# Patient Record
Sex: MALE | Race: WHITE | NOT HISPANIC OR LATINO | URBAN - METROPOLITAN AREA
[De-identification: names, ages, dates, MRNs, and addresses within clinical notes are randomized per-mention and may not be internally consistent; named-entity substitution may affect disease eponyms.]

---

## 2024-10-26 ENCOUNTER — TELEMEDICINE (OUTPATIENT)
Dept: ENDOCRINOLOGY | Facility: CLINIC | Age: 44
End: 2024-10-26
Payer: COMMERCIAL

## 2024-10-26 DIAGNOSIS — Z31.41 FERTILITY TESTING: ICD-10-CM

## 2024-10-26 DIAGNOSIS — Z11.3 ENCOUNTER FOR SCREENING EXAMINATION FOR SEXUALLY TRANSMITTED DISEASE: Primary | ICD-10-CM

## 2024-10-26 PROCEDURE — 99202 OFFICE O/P NEW SF 15 MIN: CPT | Performed by: OBSTETRICS & GYNECOLOGY

## 2024-10-26 NOTE — PROGRESS NOTES
PARTNER HISTORY      Fabi Lopez (Legal Name)     43 y.o., 8/1/1981          Occupation: [employer]  Prior fertility history: 1 son together  PMH: BCC (small lesion removed)  PSH: None, pilonidal cyst  Past psych history: No  Prior hospitalization for mental health disorder: No  History of reproductive injuries or surgeries:: No  Smoking: No  Alcohol Use: No  Drug Use: No  History of incarceration: No  Medications: None  History of STD:  No  History of testosterone use: No  History of reproductive anomalies: No  Prior Semen Analysis completed? No    PLAN    Orders Placed This Encounter   Procedures    Hepatitis B surface antigen    Hepatitis C antibody    HIV 1/2 Antigen/Antibody Screen with Reflex to Confirmation    Syphilis Screen with Reflex    C. trachomatis / N. gonorrhoeae, Amplified    POCT Semen Analysis Complete with Strict Morphology     Mirna Liao 10/26/24 1:54 PM

## 2024-11-15 ENCOUNTER — ANCILLARY PROCEDURE (OUTPATIENT)
Dept: ENDOCRINOLOGY | Facility: CLINIC | Age: 44
End: 2024-11-15
Payer: COMMERCIAL

## 2024-11-15 DIAGNOSIS — Z11.3 ENCOUNTER FOR SCREENING EXAMINATION FOR SEXUALLY TRANSMITTED DISEASE: ICD-10-CM

## 2024-11-15 DIAGNOSIS — Z31.41 FERTILITY TESTING: ICD-10-CM

## 2024-11-15 LAB
% EX RESIDUAL CYTOPLASM (SEMEN): 0 %
% EX RESIDUAL CYTOPLASM (SEMEN): 0 %
% HEAD DEFECTS (SEMEN): 96 %
% NECK MIDPIECE (SEMEN): 42 %
% NECK MIDPIECE (SEMEN): 42 %
% NORMAL (SEMEN): 4 % (ref 4–?)
% TAIL DEFECTS (SEMEN): 7 %
% TAIL DEFECTS (SEMEN): 7 %
ABSTINENCE (DAYS): 11 DAYS (ref 2–7)
ABSTINENCE (DAYS): 11 DAYS (ref 2–7)
AGGLUTINATION (SEMEN): NO
AMOUNT MISSED:: ABNORMAL
ANALYZED TIME:: ABNORMAL
ANALYZED TIME:: ABNORMAL
ANDROLOGY LAB ID#: ABNORMAL
CLUMPS (SEMEN): YES
COLLECTED COMPLETELY: YES
COLLECTED COMPLETELY: YES
COLLECTION LOCATION:: ABNORMAL
COLLECTION LOCATION:: ABNORMAL
COLLECTION METHOD:: ABNORMAL
COLLECTION METHOD:: ABNORMAL
CONCENTRATION (URINE): ABNORMAL
CONCENTRATION(SEMEN): 59.55 MILL/ML (ref 15–?)
DEBRIS (SEMEN): YES
NON PROG. MOTILITY (SEMEN): 9 %
NON PROG. MOTILITY (URINE): ABNORMAL
PORTION MISSED REI: ABNORMAL
PROG. MOTILITY (SEMEN): 45 % (ref 32–?)
PROG. MOTILITY (URINE): ABNORMAL
RECEIVED TIME:: ABNORMAL
RECEIVED TIME:: ABNORMAL
REI PARTNER DOB: ABNORMAL
REI PARTNER NAME: ABNORMAL
SEMEN APPEARANCE: NORMAL
SEMEN LIQUEFACTION: NORMAL
SEMEN VISCOSITY: NORMAL
TOT. NO OF NORM. MOTILE SPERM (SEMEN): 14.31 MILL
TOT. NO OF NORM. MOTILE SPERM (SEMEN): 14.31 MILL
TOT. NO OF NORM. SPERM (SEMEN): 26.68 MILL
TOT. NO OF NORM. SPERM (SEMEN): 26.68 MILL
TOTAL MOTILITY (SEMEN): 54 % (ref 40–?)
TOTAL MOTILITY (URINE): ABNORMAL
TOTAL NO OF MOTILE (SEMEN): 357.79 MILL
TOTAL NO OF MOTILE (URINE): ABNORMAL
TOTAL NO OF RND CELLS (URINE): ABNORMAL
TOTAL NO OF SPERM (SEMEN): 666.99 MILL (ref 39–?)
TOTAL NO OF SPERM (URINE): ABNORMAL
VOLUME (SEMEN): 11.2 ML (ref 1.5–?)
VOLUME OF URINE: ABNORMAL

## 2024-11-15 PROCEDURE — 89322 SEMEN ANAL STRICT CRITERIA: CPT | Performed by: STUDENT IN AN ORGANIZED HEALTH CARE EDUCATION/TRAINING PROGRAM

## 2024-11-15 PROCEDURE — 87491 CHLMYD TRACH DNA AMP PROBE: CPT

## 2024-11-15 PROCEDURE — 87340 HEPATITIS B SURFACE AG IA: CPT

## 2024-11-15 PROCEDURE — 87389 HIV-1 AG W/HIV-1&-2 AB AG IA: CPT

## 2024-11-15 PROCEDURE — 86803 HEPATITIS C AB TEST: CPT

## 2024-11-15 PROCEDURE — 86780 TREPONEMA PALLIDUM: CPT

## 2024-11-15 PROCEDURE — 87591 N.GONORRHOEAE DNA AMP PROB: CPT

## 2024-11-16 LAB
HBV SURFACE AG SERPL QL IA: NONREACTIVE
HCV AB SER QL: NONREACTIVE
HIV 1+2 AB+HIV1 P24 AG SERPL QL IA: NONREACTIVE
TREPONEMA PALLIDUM IGG+IGM AB [PRESENCE] IN SERUM OR PLASMA BY IMMUNOASSAY: NONREACTIVE

## 2024-11-17 LAB
C TRACH RRNA SPEC QL NAA+PROBE: NEGATIVE
N GONORRHOEA DNA SPEC QL PROBE+SIG AMP: NEGATIVE

## 2025-01-31 ENCOUNTER — ANCILLARY PROCEDURE (OUTPATIENT)
Dept: ENDOCRINOLOGY | Facility: CLINIC | Age: 45
End: 2025-01-31

## 2025-01-31 ENCOUNTER — APPOINTMENT (OUTPATIENT)
Dept: ENDOCRINOLOGY | Facility: CLINIC | Age: 45
End: 2025-01-31
Payer: COMMERCIAL

## 2025-01-31 DIAGNOSIS — Z31.41 FERTILITY TESTING: ICD-10-CM

## 2025-01-31 LAB
ABSTINENCE (DAYS): 2 DAYS (ref 2–7)
AGGLUTINATION (SEMEN): NO
ANALYZED TIME:: ABNORMAL
ANDROLOGY LAB ID#: ABNORMAL
CLUMPS (SEMEN): NO
COLLECTED COMPLETELY: YES
COLLECTION LOCATION:: ABNORMAL
COLLECTION METHOD:: ABNORMAL
CONCENTRATION CN (POST-WASH): 28.18 MILL/ML
CONCENTRATION(SEMEN): 31.75 MILL/ML (ref 15–?)
DEBRIS (SEMEN): YES
NON PROG. MOTILITY (SEMEN): 3 %
NON PROG. MOTILITY CN (POST-WASH): 6 %
PROG. MOTILITY (SEMEN): 31 % (ref 32–?)
PROG. MOTILITY CN (POST-WASH): 61 %
RECEIVED TIME:: ABNORMAL
REI PARTNER DOB: ABNORMAL
REI PARTNER NAME: ABNORMAL
SEMEN APPEARANCE: NORMAL
SEMEN LIQUEFACTION: NORMAL
SEMEN VISCOSITY: NORMAL
SPERM PROCESS METHOD: ABNORMAL
TOTAL MOTILITY (SEMEN): 34 % (ref 40–?)
TOTAL MOTILITY CN (POST-WASH): 67 %
TOTAL NO OF MOTILE (SEMEN): 70.16 MILL
TOTAL NO OF MOTILE CN (POST-WASH): 9.51 MILL
TOTAL NO OF SPERM (SEMEN): 206.35 MILL (ref 39–?)
TOTAL NO OF SPERM CN (POST-WASH): 14.09 MILL
VOLUME (SEMEN): 6.5 ML (ref 1.5–?)
VOLUME CN (POST-WASH): 0.5 ML

## 2025-01-31 PROCEDURE — 89261 SPERM ISOLATION COMPLEX: CPT | Performed by: OBSTETRICS & GYNECOLOGY

## 2025-02-14 DIAGNOSIS — Z31.41 FERTILITY TESTING: ICD-10-CM

## 2025-02-27 ENCOUNTER — ANCILLARY PROCEDURE (OUTPATIENT)
Dept: ENDOCRINOLOGY | Facility: CLINIC | Age: 45
End: 2025-02-27

## 2025-02-27 DIAGNOSIS — Z31.41 FERTILITY TESTING: ICD-10-CM

## 2025-02-27 LAB
ABSTINENCE (DAYS): 2 DAYS (ref 2–7)
AGGLUTINATION (SEMEN): NO
ANALYZED TIME:: NORMAL
ANDROLOGY LAB ID#: NORMAL
CLUMPS (SEMEN): YES
COLLECTED COMPLETELY: YES
COLLECTION LOCATION:: NORMAL
COLLECTION METHOD:: NORMAL
CONCENTRATION CN (POST-WASH): 45.58 MILL/ML
CONCENTRATION(SEMEN): 47.09 MILL/ML (ref 15–?)
DEBRIS (SEMEN): YES
LEUKOCYTE (SEMEN): NORMAL
NON PROG. MOTILITY (SEMEN): 3 %
NON PROG. MOTILITY CN (POST-WASH): 4 %
PROG. MOTILITY (SEMEN): 45 % (ref 32–?)
PROG. MOTILITY CN (POST-WASH): 78 %
RECEIVED TIME:: NORMAL
REI PARTNER DOB: NORMAL
REI PARTNER NAME: NORMAL
SEMEN APPEARANCE: NORMAL
SEMEN LIQUEFACTION: NORMAL
SEMEN VISCOSITY: NORMAL
SPERM PROCESS METHOD: NORMAL
TOTAL MOTILITY (SEMEN): 47 % (ref 40–?)
TOTAL MOTILITY CN (POST-WASH): 82 %
TOTAL NO OF MOTILE (SEMEN): 129.64 MILL
TOTAL NO OF MOTILE CN (POST-WASH): 20.52 MILL
TOTAL NO OF RND CELLS (SEMEN): 0.7 MILL (ref ?–5)
TOTAL NO OF SPERM (SEMEN): 273.14 MILL (ref 39–?)
TOTAL NO OF SPERM CN (POST-WASH): 25.07 MILL
VOLUME (SEMEN): 5.8 ML (ref 1.5–?)
VOLUME CN (POST-WASH): 0.55 ML

## 2025-06-19 DIAGNOSIS — Z31.41 FERTILITY TESTING: ICD-10-CM

## 2025-07-19 ENCOUNTER — ANCILLARY PROCEDURE (OUTPATIENT)
Dept: ENDOCRINOLOGY | Facility: CLINIC | Age: 45
End: 2025-07-19

## 2025-07-19 DIAGNOSIS — Z31.41 FERTILITY TESTING: ICD-10-CM

## 2025-07-19 LAB
ABSTINENCE (DAYS): 14 DAYS (ref 2–7)
AGGLUTINATION (SEMEN): NO
ANALYZED TIME:: ABNORMAL
ANDROLOGY LAB ID#: ABNORMAL
CLUMPS (SEMEN): YES
COLLECTED COMPLETELY: YES
COLLECTION LOCATION:: ABNORMAL
COLLECTION METHOD:: ABNORMAL
CONCENTRATION CN (POST-WASH): 107.57 MILL/ML
CONCENTRATION(SEMEN): 75.28 MILL/ML (ref 15–?)
DEBRIS (SEMEN): YES
NON PROG. MOTILITY (SEMEN): 6 %
NON PROG. MOTILITY CN (POST-WASH): 7 %
PROG. MOTILITY (SEMEN): 62 % (ref 32–?)
PROG. MOTILITY CN (POST-WASH): 87 %
RECEIVED TIME:: ABNORMAL
REI PARTNER DOB: ABNORMAL
REI PARTNER NAME: ABNORMAL
SEMEN APPEARANCE: NORMAL
SEMEN LIQUEFACTION: NORMAL
SEMEN VISCOSITY: NORMAL
SPERM PROCESS METHOD: ABNORMAL
TOTAL MOTILITY (SEMEN): 68 % (ref 40–?)
TOTAL MOTILITY CN (POST-WASH): 94 %
TOTAL NO OF MOTILE (SEMEN): 378.37 MILL
TOTAL NO OF MOTILE CN (POST-WASH): 60.65 MILL
TOTAL NO OF SPERM (SEMEN): 557.09 MILL (ref 39–?)
TOTAL NO OF SPERM CN (POST-WASH): 64.54 MILL
VOLUME (SEMEN): 7.4 ML (ref 1.5–?)
VOLUME CN (POST-WASH): 0.6 ML